# Patient Record
Sex: FEMALE | Race: BLACK OR AFRICAN AMERICAN | NOT HISPANIC OR LATINO | Employment: OTHER | URBAN - METROPOLITAN AREA
[De-identification: names, ages, dates, MRNs, and addresses within clinical notes are randomized per-mention and may not be internally consistent; named-entity substitution may affect disease eponyms.]

---

## 2017-02-15 ENCOUNTER — HISTORICAL (OUTPATIENT)
Dept: BARIATRICS | Facility: HOSPITAL | Age: 52
End: 2017-02-15

## 2017-04-21 ENCOUNTER — HISTORICAL (OUTPATIENT)
Dept: BARIATRICS | Facility: HOSPITAL | Age: 52
End: 2017-04-21

## 2017-06-21 ENCOUNTER — HISTORICAL (OUTPATIENT)
Dept: BARIATRICS | Facility: HOSPITAL | Age: 52
End: 2017-06-21

## 2017-10-06 ENCOUNTER — HISTORICAL (OUTPATIENT)
Dept: BARIATRICS | Facility: HOSPITAL | Age: 52
End: 2017-10-06

## 2017-12-13 ENCOUNTER — HISTORICAL (OUTPATIENT)
Dept: BARIATRICS | Facility: HOSPITAL | Age: 52
End: 2017-12-13

## 2018-07-09 ENCOUNTER — HISTORICAL (OUTPATIENT)
Dept: BARIATRICS | Facility: HOSPITAL | Age: 53
End: 2018-07-09

## 2019-06-26 PROBLEM — E78.2 MIXED HYPERLIPIDEMIA: Status: ACTIVE | Noted: 2018-10-22

## 2019-06-26 PROBLEM — M12.9 ARTHROPATHY: Status: ACTIVE | Noted: 2018-10-22

## 2020-01-21 PROBLEM — F32.A DEPRESSION: Status: ACTIVE | Noted: 2020-01-21

## 2020-01-21 PROBLEM — F41.9 ANXIETY: Status: ACTIVE | Noted: 2020-01-21

## 2020-11-06 PROBLEM — M19.90 OSTEOARTHRITIS: Status: ACTIVE | Noted: 2020-11-06

## 2022-02-28 ENCOUNTER — HISTORICAL (OUTPATIENT)
Dept: ADMINISTRATIVE | Facility: HOSPITAL | Age: 57
End: 2022-02-28

## 2022-06-14 PROBLEM — H91.93 BILATERAL HEARING LOSS: Status: ACTIVE | Noted: 2022-06-14

## 2022-06-17 ENCOUNTER — HISTORICAL (OUTPATIENT)
Dept: ADMINISTRATIVE | Facility: HOSPITAL | Age: 57
End: 2022-06-17

## 2022-09-30 PROBLEM — F41.1 GAD (GENERALIZED ANXIETY DISORDER): Status: ACTIVE | Noted: 2022-09-30

## 2023-03-07 ENCOUNTER — LAB VISIT (OUTPATIENT)
Dept: LAB | Facility: HOSPITAL | Age: 58
End: 2023-03-07
Attending: OTOLARYNGOLOGY
Payer: OTHER GOVERNMENT

## 2023-03-07 DIAGNOSIS — Z79.01 LONG TERM (CURRENT) USE OF ANTICOAGULANTS: Primary | ICD-10-CM

## 2023-03-07 DIAGNOSIS — Z79.01 LONG TERM (CURRENT) USE OF ANTICOAGULANTS: ICD-10-CM

## 2023-03-07 DIAGNOSIS — Z01.818 OTHER SPECIFIED PRE-OPERATIVE EXAMINATION: ICD-10-CM

## 2023-03-07 LAB
ALBUMIN SERPL-MCNC: 3.8 G/DL (ref 3.5–5)
ALBUMIN/GLOB SERPL: 1.1 RATIO (ref 1.1–2)
ALP SERPL-CCNC: 66 UNIT/L (ref 40–150)
ALT SERPL-CCNC: 20 UNIT/L (ref 0–55)
APTT PPP: 27.8 SECONDS (ref 23.2–33.7)
AST SERPL-CCNC: 22 UNIT/L (ref 5–34)
BASOPHILS # BLD AUTO: 0.02 X10(3)/MCL (ref 0–0.2)
BASOPHILS NFR BLD AUTO: 0.5 %
BILIRUBIN DIRECT+TOT PNL SERPL-MCNC: 0.4 MG/DL
BUN SERPL-MCNC: 14.2 MG/DL (ref 9.8–20.1)
CALCIUM SERPL-MCNC: 9.5 MG/DL (ref 8.4–10.2)
CHLORIDE SERPL-SCNC: 107 MMOL/L (ref 98–107)
CO2 SERPL-SCNC: 28 MMOL/L (ref 22–29)
CREAT SERPL-MCNC: 0.78 MG/DL (ref 0.55–1.02)
EOSINOPHIL # BLD AUTO: 0.05 X10(3)/MCL (ref 0–0.9)
EOSINOPHIL NFR BLD AUTO: 1.3 %
ERYTHROCYTE [DISTWIDTH] IN BLOOD BY AUTOMATED COUNT: 13.1 % (ref 11.5–17)
GFR SERPLBLD CREATININE-BSD FMLA CKD-EPI: >60 MLS/MIN/1.73/M2
GLOBULIN SER-MCNC: 3.6 GM/DL (ref 2.4–3.5)
GLUCOSE SERPL-MCNC: 81 MG/DL (ref 74–100)
HCT VFR BLD AUTO: 41.8 % (ref 37–47)
HGB BLD-MCNC: 13 G/DL (ref 12–16)
IMM GRANULOCYTES # BLD AUTO: 0.01 X10(3)/MCL (ref 0–0.04)
IMM GRANULOCYTES NFR BLD AUTO: 0.3 %
INR BLD: 0.99 (ref 0–1.3)
LYMPHOCYTES # BLD AUTO: 1.45 X10(3)/MCL (ref 0.6–4.6)
LYMPHOCYTES NFR BLD AUTO: 37.9 %
MCH RBC QN AUTO: 27.8 PG
MCHC RBC AUTO-ENTMCNC: 31.1 G/DL (ref 33–36)
MCV RBC AUTO: 89.3 FL (ref 80–94)
MONOCYTES # BLD AUTO: 0.26 X10(3)/MCL (ref 0.1–1.3)
MONOCYTES NFR BLD AUTO: 6.8 %
NEUTROPHILS # BLD AUTO: 2.04 X10(3)/MCL (ref 2.1–9.2)
NEUTROPHILS NFR BLD AUTO: 53.2 %
NRBC BLD AUTO-RTO: 0 %
PLATELET # BLD AUTO: 263 X10(3)/MCL (ref 130–400)
PMV BLD AUTO: 12.1 FL (ref 7.4–10.4)
POTASSIUM SERPL-SCNC: 4.1 MMOL/L (ref 3.5–5.1)
PROT SERPL-MCNC: 7.4 GM/DL (ref 6.4–8.3)
PROTHROMBIN TIME: 13 SECONDS (ref 12.5–14.5)
RBC # BLD AUTO: 4.68 X10(6)/MCL (ref 4.2–5.4)
SODIUM SERPL-SCNC: 142 MMOL/L (ref 136–145)
WBC # SPEC AUTO: 3.8 X10(3)/MCL (ref 4.5–11.5)

## 2023-03-07 PROCEDURE — 36415 COLL VENOUS BLD VENIPUNCTURE: CPT

## 2023-03-07 PROCEDURE — 85730 THROMBOPLASTIN TIME PARTIAL: CPT

## 2023-03-07 PROCEDURE — 85610 PROTHROMBIN TIME: CPT

## 2023-03-07 PROCEDURE — 85025 COMPLETE CBC W/AUTO DIFF WBC: CPT

## 2023-03-07 PROCEDURE — 80053 COMPREHEN METABOLIC PANEL: CPT

## 2023-06-28 ENCOUNTER — HOSPITAL ENCOUNTER (OUTPATIENT)
Dept: RADIOLOGY | Facility: HOSPITAL | Age: 58
Discharge: HOME | End: 2023-06-28
Attending: NURSE PRACTITIONER
Payer: OTHER GOVERNMENT

## 2023-06-28 VITALS — WEIGHT: 293 LBS | BODY MASS INDEX: 53.92 KG/M2 | HEIGHT: 62 IN

## 2023-06-28 DIAGNOSIS — Z12.31 BREAST CANCER SCREENING BY MAMMOGRAM: ICD-10-CM

## 2023-06-28 PROCEDURE — 77067 SCR MAMMO BI INCL CAD: CPT | Mod: TC

## 2023-06-28 NOTE — PROGRESS NOTES
Notified patient of normal mammogram findings and to repeat in one year , recommended Monthly Self breast exams

## 2023-10-31 ENCOUNTER — OUTSIDE PLACE OF SERVICE (OUTPATIENT)
Dept: PULMONOLOGY | Facility: CLINIC | Age: 58
End: 2023-10-31
Payer: OTHER GOVERNMENT

## 2023-11-02 PROCEDURE — 95810 POLYSOM 6/> YRS 4/> PARAM: CPT | Mod: 26,,, | Performed by: INTERNAL MEDICINE

## 2023-11-02 PROCEDURE — 95810 PR POLYSOMNOGRAPHY, 4 OR MORE: ICD-10-PCS | Mod: 26,,, | Performed by: INTERNAL MEDICINE

## 2023-11-22 ENCOUNTER — TELEPHONE (OUTPATIENT)
Dept: UROLOGY | Facility: CLINIC | Age: 58
End: 2023-11-22
Payer: OTHER GOVERNMENT

## 2023-12-06 ENCOUNTER — OFFICE VISIT (OUTPATIENT)
Dept: UROLOGY | Facility: CLINIC | Age: 58
End: 2023-12-06
Payer: OTHER GOVERNMENT

## 2023-12-06 VITALS — WEIGHT: 290 LBS | BODY MASS INDEX: 53.37 KG/M2 | HEIGHT: 62 IN

## 2023-12-06 DIAGNOSIS — N39.41 URGE INCONTINENCE: ICD-10-CM

## 2023-12-06 DIAGNOSIS — R39.15 URINARY URGENCY: ICD-10-CM

## 2023-12-06 DIAGNOSIS — R35.0 URINARY FREQUENCY: Primary | ICD-10-CM

## 2023-12-06 LAB
BILIRUBIN, UA POC OHS: NEGATIVE
BLOOD, UA POC OHS: NEGATIVE
CLARITY, UA POC OHS: CLEAR
COLOR, UA POC OHS: YELLOW
GLUCOSE, UA POC OHS: NEGATIVE
KETONES, UA POC OHS: NEGATIVE
LEUKOCYTES, UA POC OHS: NEGATIVE
NITRITE, UA POC OHS: NEGATIVE
PH, UA POC OHS: 7
PROTEIN, UA POC OHS: NEGATIVE
SPECIFIC GRAVITY, UA POC OHS: 1.02
UROBILINOGEN, UA POC OHS: 0.2

## 2023-12-06 PROCEDURE — 99204 PR OFFICE/OUTPT VISIT, NEW, LEVL IV, 45-59 MIN: ICD-10-PCS | Mod: S$GLB,,, | Performed by: NURSE PRACTITIONER

## 2023-12-06 PROCEDURE — 81003 URINALYSIS AUTO W/O SCOPE: CPT | Mod: QW,S$GLB,, | Performed by: NURSE PRACTITIONER

## 2023-12-06 PROCEDURE — 81003 POCT URINALYSIS(INSTRUMENT): ICD-10-PCS | Mod: QW,S$GLB,, | Performed by: NURSE PRACTITIONER

## 2023-12-06 PROCEDURE — 99204 OFFICE O/P NEW MOD 45 MIN: CPT | Mod: S$GLB,,, | Performed by: NURSE PRACTITIONER

## 2023-12-06 RX ORDER — OXYBUTYNIN CHLORIDE 15 MG/1
15 TABLET, EXTENDED RELEASE ORAL DAILY
Qty: 90 TABLET | Refills: 3 | Status: SHIPPED | OUTPATIENT
Start: 2023-12-06 | End: 2024-01-31

## 2023-12-06 NOTE — PROGRESS NOTES
Subjective:       Patient ID: Xena Holliday is a 58 y.o. female.    Chief Complaint: No chief complaint on file.      HPI: 58-year-old female, new to [facility], [facility]    Patient has history of urinary frequency, urinary urgency, and incontinence.    Patient was on Myrbetriq with Dr. Childs.  [facility] states that the [facility] discontinued [facility].  Patient states that she has to wear pads.  Goes through multiple per day.  She denies any pain or burning urination.  Denies any odor urine.  Denies any fever or body aches.  Denies any unexpected weight loss.  Denies any blood in urine.    No other urinary complaints at this time.         Past Medical History:   Past Medical History:   Diagnosis Date    Anxiety     Arthritis     Bilateral hearing loss 06/14/2022    Wears hearing aids    Depression     Diverticulitis     Essential hypertension 08/04/2014    JOHN (generalized anxiety disorder) 09/30/2022    GERD (gastroesophageal reflux disease)     Mixed hyperlipidemia 10/22/2018    Used to take a statin but stopped after gastric sleeve PCP was Dr. Slaughter but now seeing us and we will refill meds in the future (10/2018) Colonoscopy 2018 fine MMG 2018 normal.  Total Hyst so no PAPs anymore. Hx Gastric Sleeve 2016 (lost 120lb) had Pneumonia vaccine in the past 5 years.  not due again until age 67    PTSD (post-traumatic stress disorder)     Sinusitis     Sleep apnea     Tinnitus     Vertigo        Past Surgical Historical:        Medications:   Medication List with Changes/Refills   New Medications    OXYBUTYNIN (DITROPAN XL) 15 MG TR24    Take 1 tablet (15 mg total) by mouth once daily.   Current Medications    ALPRAZOLAM (XANAX) 0.25 MG TABLET    TAKE ONE TABLET BY MOUTH EVERY DAY AS NEEDED anxiety    ARIPIPRAZOLE (ABILIFY) 2 MG TAB    TAKE ONE TABLET BY MOUTH EVERY MORNING    CYANOCOBALAMIN (VITAMIN B-12) 1000 MCG TABLET    Take 1 tablet by mouth once daily.    DESONIDE (DESOWEN) 0.05 % CREAM     APPLY TO THE AFFECTED AREA(S) TWICE DAILY FOR SEVEN DAYS    DEXLANSOPRAZOLE (DEXILANT) 60 MG CAPSULE    Take 60 mg by mouth Daily.    DICLOFENAC SODIUM (VOLTAREN) 1 % GEL    Apply 2 g topically 4 (four) times daily. To affected area of ankle and elbow    ELETRIPTAN (RELPAX) 40 MG TABLET    Take 1 tablet by mouth once daily.    EMGALITY  MG/ML PNIJ    inject 120mg into THE SKIN EVERY 28 DAYS    FAMOTIDINE (PEPCID) 40 MG TABLET    take 1 tablet by mouth every evening as needed for breakthrough heartburn and acid relfux    FOLIC ACID (FOLVITE) 1 MG TABLET    Take 1 tablet by mouth once daily.    HYDRALAZINE (APRESOLINE) 10 MG TABLET    Take 10 mg by mouth 3 (three) times daily.    MAGNESIUM OXIDE (MAG-OX) 400 MG (241.3 MG MAGNESIUM) TABLET    Take 1 tablet by mouth nightly.    METHYL SALICYLATE-MENTHOL 15-10% 15-10 % CREA    APPLY MODERATE AMOUNT TO THE SKIN THREE TIMES DAILY AS NEEDED FOR PAIN    METOCLOPRAMIDE HCL (REGLAN) 10 MG TABLET    5 mg.    MOUNJARO 2.5 MG/0.5 ML PNIJ    INJECT 2.5ml subcutaneously every week FOR FOUR WEEKS THEN increase    MULTIVITAMIN (THERAGRAN) PER TABLET    Take 1 tablet by mouth once daily.    MYRBETRIQ 50 MG TB24    take 1 tablet by mouth once daily    NAPROXEN (NAPROSYN) 500 MG TABLET    Take 1 tablet (500 mg total) by mouth 2 (two) times daily as needed (pain). Take with food    NEXLIZET 180-10 MG TAB    TAKE ONE TABLET BY MOUTH EVERY DAY    NYSTATIN (MYCOSTATIN) POWDER    Apply topically 2 (two) times daily. To affected area of skin    OLMESARTAN-HYDROCHLOROTHIAZIDE (BENICAR HCT) 40-12.5 MG TAB    Take 1 tablet by mouth once daily.    OMEPRAZOLE (PRILOSEC) 40 MG CAPSULE    Take 1 capsule (40 mg total) by mouth every morning.    OZEMPIC 0.25 MG OR 0.5 MG(2 MG/1.5 ML) PEN INJECTOR    inject 0.5mg subcutaneously EVERY WEEK    OZEMPIC 1 MG/DOSE (4 MG/3 ML)    inject 1mg ONCE WEEKLY ON THE same DAY of each WEEK    PRAZOSIN (MINIPRESS) 5 MG CAPSULE    Take 1 capsule (5 mg total) by  mouth every evening.    PREDNISONE (DELTASONE) 20 MG TABLET    Take 20mg by mouth twice daily for 2 days then 20mg by mouth once daily for 3 days.    PROPRANOLOL (INDERAL LA) 60 MG 24 HR CAPSULE    Take 1 capsule (60 mg total) by mouth once daily.    ROSUVASTATIN (CRESTOR) 40 MG TAB    TAKE ONE TABLET BY MOUTH EVERY DAY FOR CHOLESTEROL /LDL LOWERING    SERTRALINE (ZOLOFT) 100 MG TABLET    take 2 tablets by mouth every evening    TRAZODONE (DESYREL) 150 MG TABLET    Take 1 tablet (150 mg total) by mouth every evening.    VITAMIN D (VITAMIN D3) 1000 UNITS TAB    Take 2,000 Units by mouth 2 (two) times a day.        Past Social History:   Social History     Socioeconomic History    Marital status:    Occupational History    Occupation: [employer]     Employer: [email or web address]   Tobacco Use    Smoking status: Never    Smokeless tobacco: Never   Substance and Sexual Activity    Alcohol use: Not Currently    Drug use: Never       Allergies:   Review of patient's allergies indicates:   Allergen Reactions    Tramadol Anaphylaxis    Acetaminophen-codeine     Hydrocod-cpm-pe-acetaminophen      Suicidal ideations    Lisinopril Swelling    Nsaids (non-steroidal anti-inflammatory drug)      Patient stated she was instructed to refrain from NSAID's after weight loss surgery in 2016    Toradol [ketorolac] Itching    Adhesive Blisters        Family History:   Family History   Problem Relation Age of Onset    Hypertension Mother     Diabetes Mother     Hyperlipidemia Mother     COPD Mother     Heart disease Father     Heart disease Brother     Diabetes Maternal Grandmother     Lupus Paternal Uncle     Lupus Paternal Aunt     Breast cancer Neg Hx     Colon cancer Neg Hx         Review of Systems:  Review of Systems   Constitutional:  Negative for activity change and appetite change.   HENT:  Negative for congestion and dental problem.    Respiratory:  Negative for chest tightness and shortness of breath.     Cardiovascular:  Negative for chest pain.   Gastrointestinal:  Negative for abdominal distention and abdominal pain.   Genitourinary:  Positive for frequency and urgency. Negative for decreased urine volume, difficulty urinating, dyspareunia, dysuria, enuresis, flank pain, genital sores, hematuria and pelvic pain.   Musculoskeletal:  Negative for back pain and neck pain.   Allergic/Immunologic: Negative for immunocompromised state.   Neurological:  Negative for dizziness.   Hematological:  Negative for adenopathy.   Psychiatric/Behavioral:  Negative for agitation, behavioral problems and confusion.        Physical Exam:  Physical Exam  Vitals and nursing note reviewed.   Constitutional:       Appearance: She is well-developed.   HENT:      Head: Normocephalic.   Eyes:      Pupils: Pupils are equal, round, and reactive to light.   Cardiovascular:      Rate and Rhythm: Normal rate and regular rhythm.      Heart sounds: Normal heart sounds.   Pulmonary:      Effort: Pulmonary effort is normal.      Breath sounds: Normal breath sounds.   Abdominal:      General: Bowel sounds are normal.      Palpations: Abdomen is soft.   Musculoskeletal:         General: Normal range of motion.      Cervical back: Normal range of motion and neck supple.   Skin:     General: Skin is warm and dry.   Neurological:      Mental Status: She is alert and oriented to person, place, and time.   Psychiatric:         Mood and Affect: Mood normal.         Behavior: Behavior normal.       Urinalysis:  Normal   Bladder scan:  0 cc    Assessment/Plan:   Frequency/urgency/incontinence:  Patient has been on Myrbetriq in the past with improvement.    However, [facility] not [facility]    Will start patient on oxybutynin XL 15 mg daily.    Will plan follow-up in 6-8 weeks for re-evaluation, sooner if needed.  Problem List Items Addressed This Visit    None  Visit Diagnoses       Urinary frequency    -  Primary    Relevant Medications    oxybutynin  (DITROPAN XL) 15 MG TR24    Other Relevant Orders    POCT Urinalysis(Instrument)    POCT Bladder Scan    Urinary urgency        Relevant Medications    oxybutynin (DITROPAN XL) 15 MG TR24    Other Relevant Orders    POCT Urinalysis(Instrument)    POCT Bladder Scan    Urge incontinence        Relevant Medications    oxybutynin (DITROPAN XL) 15 MG TR24    Other Relevant Orders    POCT Urinalysis(Instrument)    POCT Bladder Scan

## 2024-01-31 ENCOUNTER — OFFICE VISIT (OUTPATIENT)
Dept: UROLOGY | Facility: CLINIC | Age: 59
End: 2024-01-31
Payer: OTHER GOVERNMENT

## 2024-01-31 VITALS — HEIGHT: 62 IN | WEIGHT: 290 LBS | BODY MASS INDEX: 53.37 KG/M2

## 2024-01-31 DIAGNOSIS — R35.0 URINARY FREQUENCY: Primary | ICD-10-CM

## 2024-01-31 DIAGNOSIS — R39.15 URINARY URGENCY: ICD-10-CM

## 2024-01-31 PROCEDURE — 99213 OFFICE O/P EST LOW 20 MIN: CPT | Mod: S$GLB,,, | Performed by: NURSE PRACTITIONER

## 2024-01-31 RX ORDER — AMLODIPINE BESYLATE 5 MG/1
1 TABLET ORAL DAILY
COMMUNITY
Start: 2023-11-21

## 2024-01-31 RX ORDER — LOSARTAN POTASSIUM AND HYDROCHLOROTHIAZIDE 12.5; 5 MG/1; MG/1
1 TABLET ORAL DAILY PRN
COMMUNITY
Start: 2023-10-26

## 2024-01-31 RX ORDER — SOLIFENACIN SUCCINATE 10 MG/1
10 TABLET, FILM COATED ORAL DAILY
Qty: 90 TABLET | Refills: 3 | Status: SHIPPED | OUTPATIENT
Start: 2024-01-31 | End: 2024-04-10

## 2024-01-31 NOTE — PROGRESS NOTES
Subjective:       Patient ID: Xena Holliday is a 58 y.o. female.    Chief Complaint: No chief complaint on file.      HPI: 58-year-old female, established patient, presents for 6 week follow-up.    Patient has history of urinary frequency and urgency.    She was on Myrbetriq.  However, [facility].  Pain for the medication.  This was working well for with no complaints.    Will put the patient on oxybutynin XL 15 mg daily.  She states she felt like she was not emptying her bladder all the way.  Medication was not as effective as Myrbetriq.    Denies any pain or burning urination.  Denies any odor to the urine.  Denies any fever or body aches.    No other urinary complaints at this time.         Past Medical History:   Past Medical History:   Diagnosis Date    Anxiety     Arthritis     Bilateral hearing loss 06/14/2022    Wears hearing aids    Depression     Diverticulitis     Essential hypertension 08/04/2014    JOHN (generalized anxiety disorder) 09/30/2022    GERD (gastroesophageal reflux disease)     Mixed hyperlipidemia 10/22/2018    Used to take a statin but stopped after gastric sleeve PCP was Dr. Slaughter but now seeing us and we will refill meds in the future (10/2018) Colonoscopy 2018 fine MMG 2018 normal.  Total Hyst so no PAPs anymore. Hx Gastric Sleeve 2016 (lost 120lb) had Pneumonia vaccine in the past 5 years.  not due again until age 67    PTSD (post-traumatic stress disorder)     Sinusitis     Sleep apnea     Tinnitus     Vertigo        Past Surgical Historical:        Medications:   Medication List with Changes/Refills   New Medications    SOLIFENACIN (VESICARE) 10 MG TABLET    Take 1 tablet (10 mg total) by mouth once daily.   Current Medications    ALPRAZOLAM (XANAX) 0.25 MG TABLET    TAKE ONE TABLET BY MOUTH EVERY DAY AS NEEDED anxiety    AMLODIPINE (NORVASC) 5 MG TABLET    Take 1 tablet by mouth once daily.    ARIPIPRAZOLE (ABILIFY) 2 MG TAB    TAKE ONE TABLET BY MOUTH EVERY MORNING     CYANOCOBALAMIN (VITAMIN B-12) 1000 MCG TABLET    Take 1 tablet by mouth once daily.    DESONIDE (DESOWEN) 0.05 % CREAM    APPLY TO THE AFFECTED AREA(S) TWICE DAILY FOR SEVEN DAYS    DEXLANSOPRAZOLE (DEXILANT) 60 MG CAPSULE    Take 60 mg by mouth Daily.    DICLOFENAC SODIUM (VOLTAREN) 1 % GEL    Apply 2 g topically 4 (four) times daily. To affected area of ankle and elbow    ELETRIPTAN (RELPAX) 40 MG TABLET    Take 1 tablet by mouth once daily.    EMGALITY  MG/ML PNIJ    inject 120mg into THE SKIN EVERY 28 DAYS    FAMOTIDINE (PEPCID) 40 MG TABLET    take 1 tablet by mouth every evening as needed for breakthrough heartburn and acid relfux    FOLIC ACID (FOLVITE) 1 MG TABLET    Take 1 tablet by mouth once daily.    HYDRALAZINE (APRESOLINE) 10 MG TABLET    Take 10 mg by mouth 3 (three) times daily.    LOSARTAN-HYDROCHLOROTHIAZIDE 50-12.5 MG (HYZAAR) 50-12.5 MG PER TABLET    Take 1 tablet by mouth daily as needed.    MAGNESIUM OXIDE (MAG-OX) 400 MG (241.3 MG MAGNESIUM) TABLET    Take 1 tablet by mouth nightly.    METHYL SALICYLATE-MENTHOL 15-10% 15-10 % CREA    APPLY MODERATE AMOUNT TO THE SKIN THREE TIMES DAILY AS NEEDED FOR PAIN    METOCLOPRAMIDE HCL (REGLAN) 10 MG TABLET    5 mg.    MOUNJARO 2.5 MG/0.5 ML PNIJ    INJECT 2.5ml subcutaneously every week FOR FOUR WEEKS THEN increase    MULTIVITAMIN (THERAGRAN) PER TABLET    Take 1 tablet by mouth once daily.    NAPROXEN (NAPROSYN) 500 MG TABLET    Take 1 tablet (500 mg total) by mouth 2 (two) times daily as needed (pain). Take with food    NEXLIZET 180-10 MG TAB    TAKE ONE TABLET BY MOUTH EVERY DAY    NYSTATIN (MYCOSTATIN) POWDER    Apply topically 2 (two) times daily. To affected area of skin    OLMESARTAN-HYDROCHLOROTHIAZIDE (BENICAR HCT) 40-12.5 MG TAB    Take 1 tablet by mouth once daily.    OMEPRAZOLE (PRILOSEC) 40 MG CAPSULE    Take 1 capsule (40 mg total) by mouth every morning.    OZEMPIC 0.25 MG OR 0.5 MG(2 MG/1.5 ML) PEN INJECTOR    inject 0.5mg  subcutaneously EVERY WEEK    OZEMPIC 1 MG/DOSE (4 MG/3 ML)    inject 1mg ONCE WEEKLY ON THE same DAY of each WEEK    PRAZOSIN (MINIPRESS) 5 MG CAPSULE    Take 1 capsule (5 mg total) by mouth every evening.    PREDNISONE (DELTASONE) 20 MG TABLET    Take 20mg by mouth twice daily for 2 days then 20mg by mouth once daily for 3 days.    PROPRANOLOL (INDERAL LA) 60 MG 24 HR CAPSULE    Take 1 capsule (60 mg total) by mouth once daily.    ROSUVASTATIN (CRESTOR) 40 MG TAB    TAKE ONE TABLET BY MOUTH EVERY DAY FOR CHOLESTEROL /LDL LOWERING    SERTRALINE (ZOLOFT) 100 MG TABLET    take 2 tablets by mouth every evening    TRAZODONE (DESYREL) 150 MG TABLET    Take 1 tablet (150 mg total) by mouth every evening.    VITAMIN D (VITAMIN D3) 1000 UNITS TAB    Take 2,000 Units by mouth 2 (two) times a day.   Discontinued Medications    MYRBETRIQ 50 MG TB24    take 1 tablet by mouth once daily    OXYBUTYNIN (DITROPAN XL) 15 MG TR24    Take 1 tablet (15 mg total) by mouth once daily.        Past Social History:   Social History     Socioeconomic History    Marital status:    Occupational History    Occupation: [employer]     Employer: [employer]   Tobacco Use    Smoking status: Never    Smokeless tobacco: Never   Substance and Sexual Activity    Alcohol use: Not Currently    Drug use: Never       Allergies:   Review of patient's allergies indicates:   Allergen Reactions    Tramadol Anaphylaxis    Acetaminophen-codeine     Hydrocod-cpm-pe-acetaminophen      Suicidal ideations    Lisinopril Swelling    Nsaids (non-steroidal anti-inflammatory drug)      Patient stated she was instructed to refrain from NSAID's after weight loss surgery in 2016    Toradol [ketorolac] Itching    Adhesive Blisters        Family History:   Family History   Problem Relation Age of Onset    Hypertension Mother     Diabetes Mother     Hyperlipidemia Mother     COPD Mother     Heart disease Father     Heart disease Brother     Diabetes  Maternal Grandmother     Lupus Paternal Uncle     Lupus Paternal Aunt     Breast cancer Neg Hx     Colon cancer Neg Hx         Review of Systems:  Review of Systems   Constitutional:  Negative for activity change and appetite change.   HENT:  Negative for congestion and dental problem.    Respiratory:  Negative for chest tightness and shortness of breath.    Cardiovascular:  Negative for chest pain.   Gastrointestinal:  Negative for abdominal distention.   Genitourinary:  Positive for frequency and urgency. Negative for decreased urine volume, difficulty urinating, dyspareunia, dysuria, enuresis, flank pain, genital sores, hematuria and pelvic pain.   Musculoskeletal:  Negative for back pain and neck pain.   Allergic/Immunologic: Negative for immunocompromised state.   Neurological:  Negative for dizziness.   Hematological:  Negative for adenopathy.   Psychiatric/Behavioral:  Negative for agitation, behavioral problems and confusion.        Physical Exam:  Physical Exam  Vitals and nursing note reviewed.   Constitutional:       Appearance: She is well-developed.   HENT:      Head: Normocephalic.   Cardiovascular:      Rate and Rhythm: Normal rate and regular rhythm.      Heart sounds: Normal heart sounds.   Pulmonary:      Effort: Pulmonary effort is normal.      Breath sounds: Normal breath sounds.   Abdominal:      General: Bowel sounds are normal.      Palpations: Abdomen is soft.   Skin:     General: Skin is warm and dry.   Neurological:      Mental Status: She is alert and oriented to person, place, and time.       Bladder scan 0 cc    Assessment/Plan:   Frequency/urgency:  Oxybutynin not effective.  Patient has been results with Myrbetriq.  According to the patient when he established failure with other products.    Will try VESIcare 10 mg daily.    Will follow-up in 3 months for re-evaluation, sooner if needed.  Problem List Items Addressed This Visit    None  Visit Diagnoses       Urinary frequency    -   Primary    Relevant Medications    solifenacin (VESICARE) 10 MG tablet    Other Relevant Orders    POCT Bladder Scan    Urinary urgency        Relevant Medications    solifenacin (VESICARE) 10 MG tablet    Other Relevant Orders    POCT Bladder Scan

## 2024-04-10 ENCOUNTER — OFFICE VISIT (OUTPATIENT)
Dept: UROLOGY | Facility: CLINIC | Age: 59
End: 2024-04-10
Payer: OTHER GOVERNMENT

## 2024-04-10 VITALS — HEIGHT: 62 IN | WEIGHT: 290 LBS | BODY MASS INDEX: 53.37 KG/M2

## 2024-04-10 DIAGNOSIS — R39.9 LOWER URINARY TRACT SYMPTOMS (LUTS): ICD-10-CM

## 2024-04-10 DIAGNOSIS — N39.41 URGE INCONTINENCE: Primary | ICD-10-CM

## 2024-04-10 LAB
BILIRUBIN, UA POC OHS: NEGATIVE
BLOOD, UA POC OHS: NEGATIVE
CLARITY, UA POC OHS: CLEAR
COLOR, UA POC OHS: YELLOW
GLUCOSE, UA POC OHS: NEGATIVE
KETONES, UA POC OHS: NEGATIVE
LEUKOCYTES, UA POC OHS: NEGATIVE
NITRITE, UA POC OHS: NEGATIVE
PH, UA POC OHS: 6
PROTEIN, UA POC OHS: NEGATIVE
SPECIFIC GRAVITY, UA POC OHS: 1.02
UROBILINOGEN, UA POC OHS: 0.2

## 2024-04-10 PROCEDURE — 99214 OFFICE O/P EST MOD 30 MIN: CPT | Mod: S$GLB,,,

## 2024-04-10 PROCEDURE — 81003 URINALYSIS AUTO W/O SCOPE: CPT | Mod: QW,S$GLB,,

## 2024-04-10 RX ORDER — TROSPIUM CHLORIDE 20 MG/1
20 TABLET, FILM COATED ORAL 2 TIMES DAILY
Qty: 60 TABLET | Refills: 0 | Status: SHIPPED | OUTPATIENT
Start: 2024-04-10 | End: 2024-05-10

## 2024-04-10 NOTE — PROGRESS NOTES
Subjective:       Patient ID: Xena Holliday is a 58 y.o. female.    Chief Complaint: Follow-up Clinical Reassessment      HPI: 58-year-old female established patient here for 3 month follow-up of medication for urge incontinence. At one time patient was on Myrbetric prescribed from Dr. Ring that the patient said worked well for her, however [facility] stopped [facility] that medication. Patient was then started on oxybutynin extended release that was ineffective and at last visit was started on VESIcare. Patient reports today that VESIcare is also ineffective. Patient continues to have urinary urgency and bladder pressure at the end of voiding with dribbling. Patient reports she is currently wearing panty liners and the rate of changing varies from day today. Typically she changes her panty liner 1-2 times per day however she stays close to the bathroom to avoid full episodes of incontinence. Patient denies dysuria, odor, fever chills, urinary frequency, or other symptoms of infection. Denies symptoms of stress incontinence.         Past Medical History:   Past Medical History:   Diagnosis Date    Anxiety     Arthritis     Bilateral hearing loss 06/14/2022    Wears hearing aids    Depression     Diverticulitis     Essential hypertension 08/04/2014    JOHN (generalized anxiety disorder) 09/30/2022    GERD (gastroesophageal reflux disease)     Mixed hyperlipidemia 10/22/2018    Used to take a statin but stopped after gastric sleeve PCP was Dr. Slaughter but now seeing us and we will refill meds in the future (10/2018) Colonoscopy 2018 fine [facility] 2018 normal.  Total Hyst so no PAPs anymore. Hx Gastric Sleeve 2016 (lost 120lb) had Pneumonia vaccine in the past 5 years.  not due again until age 67    PTSD (post-traumatic stress disorder)     Sinusitis     Sleep apnea     Tinnitus     Vertigo        Past Surgical Historical:   Past Surgical History:   Procedure Laterality Date    carpel tunnel Left 04/2022    carpel  tunnel sx right  Right     7/23/21    CHOLECYSTECTOMY      COLONOSCOPY N/A     Gastric sleeve  12/2016    HYSTERECTOMY  2007    Total    IMPLANTATION OF COCHLEAR PROSTHESIS Left 3/9/2023    Procedure: LEFT COCHLEAR IMPLANT *** FACICAL NERVE MONITORIN WILL BE REQUIRED BY NDM***;  Surgeon: Jordan Colmenares Jr., MD;  Location: [facility];  Service: ENT;  Laterality: Left;    KNEE ARTHROSCOPY      TUBAL LIGATION          Medications:   Medication List with Changes/Refills   New Medications    TROSPIUM (SANCTURA) 20 MG TAB TABLET    Take 1 tablet (20 mg total) by mouth 2 (two) times daily. for 60 doses   Current Medications    ALPRAZOLAM (XANAX) 0.25 MG TABLET    TAKE ONE TABLET BY MOUTH EVERY DAY AS NEEDED anxiety    AMLODIPINE (NORVASC) 5 MG TABLET    Take 1 tablet by mouth once daily.    ARIPIPRAZOLE (ABILIFY) 2 MG TAB    TAKE ONE TABLET BY MOUTH EVERY MORNING    CYANOCOBALAMIN (VITAMIN B-12) 1000 MCG TABLET    Take 1 tablet by mouth once daily.    DESONIDE (DESOWEN) 0.05 % CREAM    APPLY TO THE AFFECTED AREA(S) TWICE DAILY FOR SEVEN DAYS    DICLOFENAC SODIUM (VOLTAREN) 1 % GEL    Apply 2 g topically 4 (four) times daily. To affected area of ankle and elbow    ELETRIPTAN (RELPAX) 40 MG TABLET    Take 1 tablet by mouth once daily.    EMGALITY  MG/ML PNIJ    inject 120mg into THE SKIN EVERY 28 DAYS    FAMOTIDINE (PEPCID) 40 MG TABLET    take 1 tablet by mouth every evening as needed for breakthrough heartburn and acid relfux    FOLIC ACID (FOLVITE) 1 MG TABLET    Take 1 tablet by mouth once daily.    HYDRALAZINE (APRESOLINE) 10 MG TABLET    Take 10 mg by mouth 3 (three) times daily.    LOSARTAN-HYDROCHLOROTHIAZIDE 50-12.5 MG (HYZAAR) 50-12.5 MG PER TABLET    Take 1 tablet by mouth daily as needed.    MAGNESIUM OXIDE (MAG-OX) 400 MG (241.3 MG MAGNESIUM) TABLET    Take 1 tablet by mouth nightly.    METHYL SALICYLATE-MENTHOL 15-10% 15-10 % CREA    APPLY MODERATE AMOUNT TO THE SKIN THREE TIMES DAILY AS NEEDED FOR PAIN     METOCLOPRAMIDE HCL (REGLAN) 10 MG TABLET    5 mg.    MULTIVITAMIN (THERAGRAN) PER TABLET    Take 1 tablet by mouth once daily.    NAPROXEN (NAPROSYN) 500 MG TABLET    Take 1 tablet (500 mg total) by mouth 2 (two) times daily as needed (pain). Take with food    NYSTATIN (MYCOSTATIN) POWDER    Apply topically 2 (two) times daily. To affected area of skin    OLMESARTAN-HYDROCHLOROTHIAZIDE (BENICAR HCT) 40-12.5 MG TAB    Take 1 tablet by mouth once daily.    OMEPRAZOLE (PRILOSEC) 40 MG CAPSULE    Take 1 capsule (40 mg total) by mouth every morning.    OZEMPIC 0.25 MG OR 0.5 MG(2 MG/1.5 ML) PEN INJECTOR    inject 0.5mg subcutaneously EVERY WEEK    OZEMPIC 1 MG/DOSE (4 MG/3 ML)    inject 1mg ONCE WEEKLY ON THE same DAY of each WEEK    PRAZOSIN (MINIPRESS) 5 MG CAPSULE    Take 1 capsule (5 mg total) by mouth every evening.    PROPRANOLOL (INDERAL LA) 60 MG 24 HR CAPSULE    Take 1 capsule (60 mg total) by mouth once daily.    ROSUVASTATIN (CRESTOR) 40 MG TAB    TAKE ONE TABLET BY MOUTH EVERY DAY FOR CHOLESTEROL /LDL LOWERING    SERTRALINE (ZOLOFT) 100 MG TABLET    take 2 tablets by mouth every evening    TRAZODONE (DESYREL) 150 MG TABLET    Take 1 tablet (150 mg total) by mouth every evening.    VITAMIN D (VITAMIN D3) 1000 UNITS TAB    Take 2,000 Units by mouth 2 (two) times a day.   Discontinued Medications    DEXLANSOPRAZOLE (DEXILANT) 60 MG CAPSULE    Take 60 mg by mouth Daily.    MOUNJARO 2.5 MG/0.5 ML PNIJ    INJECT 2.5ml subcutaneously every week FOR FOUR WEEKS THEN increase    NEXLIZET 180-10 MG TAB    TAKE ONE TABLET BY MOUTH EVERY DAY    PREDNISONE (DELTASONE) 20 MG TABLET    Take 20mg by mouth twice daily for 2 days then 20mg by mouth once daily for 3 days.    SOLIFENACIN (VESICARE) 10 MG TABLET    Take 1 tablet (10 mg total) by mouth once daily.        Past Social History:   Social History     Socioeconomic History    Marital status:    Occupational History    Occupation: RESERVATION     [employer]er:  [employer]   Tobacco Use    Smoking status: Never    Smokeless tobacco: Never   Substance and Sexual Activity    Alcohol use: Not Currently    Drug use: Never       Allergies:   Review of patient's allergies indicates:   Allergen Reactions    Tramadol Anaphylaxis    Acetaminophen-codeine     Hydrocod-cpm-pe-acetaminophen      Suicidal ideations    Lisinopril Swelling    Nsaids (non-steroidal anti-inflammatory drug)      Patient stated she was instructed to refrain from NSAID's after weight loss surgery in 2016    Toradol [ketorolac] Itching    Adhesive Blisters        Family History:   Family History   Problem Relation Age of Onset    Hypertension Mother     Diabetes Mother     Hyperlipidemia Mother     COPD Mother     Heart disease Father     Heart disease Brother     Diabetes Maternal Grandmother     Lupus Paternal Uncle     Lupus Paternal Aunt     Breast cancer Neg Hx     Colon cancer Neg Hx         Review of Systems:  Review of Systems   Constitutional:  Negative for activity change, appetite change, chills, diaphoresis, fatigue, fever and unexpected weight change.   HENT:  Negative for congestion, dental problem, drooling, ear discharge, ear pain, facial swelling, hearing loss, mouth sores, nosebleeds, postnasal drip, rhinorrhea, sinus pressure, sinus pain, sneezing, sore throat, tinnitus, trouble swallowing and voice change.    Eyes:  Negative for photophobia, pain, discharge, redness, itching and visual disturbance.   Respiratory:  Negative for apnea, cough, choking, chest tightness, shortness of breath, wheezing and stridor.    Cardiovascular:  Negative for chest pain and leg swelling.   Gastrointestinal:  Negative for abdominal distention, abdominal pain, anal bleeding, blood in stool, constipation, diarrhea, nausea, rectal pain and vomiting.   Endocrine: Negative for cold intolerance, heat intolerance, polydipsia, polyphagia and polyuria.   Genitourinary:  Positive for urgency. Negative for  decreased urine volume, difficulty urinating, dyspareunia, dysuria, enuresis, flank pain, frequency, genital sores, hematuria, menstrual problem, pelvic pain, vaginal bleeding, vaginal discharge and vaginal pain.        Bladder pressure with dribbling   Musculoskeletal:  Negative for arthralgias, back pain, gait problem, joint swelling, myalgias, neck pain and neck stiffness.   Skin:  Negative for color change, pallor, rash and wound.   Allergic/Immunologic: Negative for environmental allergies, food allergies and immunocompromised state.   Neurological:  Negative for dizziness, tremors, seizures, syncope, facial asymmetry, speech difficulty, weakness, light-headedness, numbness and headaches.   Hematological:  Negative for adenopathy. Does not bruise/bleed easily.   Psychiatric/Behavioral:  Negative for agitation, behavioral problems, confusion, decreased concentration, dysphoric mood, hallucinations, self-injury, sleep disturbance and suicidal ideas. The patient is not nervous/anxious and is not hyperactive.        Physical Exam:  Physical Exam  Cardiovascular:      Rate and Rhythm: Normal rate.   Pulmonary:      Effort: Pulmonary effort is normal.   Abdominal:      General: Abdomen is flat. Bowel sounds are normal.      Palpations: Abdomen is soft.   Genitourinary:     General: Normal vulva.   Neurological:      Mental Status: She is alert and oriented to person, place, and time.     PVR: > 10 mL (bladder scanner appears to be malfunctioning and is requiring calibration)    Assessment/Plan:     Urge incontinence: Patient has had failed treatment with oxybutynin extended release and VESIcare. We will start patient on trospium. We will have patient follow-up in 4 weeks at [facility] to assess effectiveness of medication and have bladder scan to assess for urinary retention.     Discussed performing Kegel exercises to strengthen the pelvic floor. Also discussed physical therapy for pelvic health however  patient wishes to try medication and Kegel's at home prior to going to physical therapy.     Follow up in 4 weeks for bladder scan and to evaluate medication effectiveness    Problem List Items Addressed This Visit    None  Visit Diagnoses       Urge incontinence    -  Primary    Relevant Medications    trospium (SANCTURA) 20 mg Tab tablet    Other Relevant Orders    POCT Urinalysis(Instrument) (Completed)    POCT Bladder Scan    Lower urinary tract symptoms (LUTS)

## 2024-04-10 NOTE — PROGRESS NOTES
Subjective:       Patient ID: Xena Holliday is a 58 y.o. female.    Chief Complaint: No chief complaint on file.      HPI: 58-year-old female established patient here for 3 month follow-up of medication for urge incontinence.  At one time patient was on Myrbetric prescribed from Dr. Ring that the patient said worked well for her, however [facility] that [facility].  Patient was then started on oxybutynin extended release that was ineffective and at last visit was started on VESIcare.  Patient reports today that VESIcare is also ineffective.  Patient continues to have urinary urgency and bladder pressure at the end of voiding with dribbling.  Patient reports she is currently wearing panty liners and the rate of changing varies from day today.  Typically she changes her panty liner 1-2 times per day however she stays close to the bathroom to avoid full episodes of incontinence.  Patient denies dysuria, odor, fever chills, urinary frequency, or other symptoms of infection.  Denies symptoms of stress incontinence.       Past Medical History:   Past Medical History:   Diagnosis Date    Anxiety     Arthritis     Bilateral hearing loss 06/14/2022    Wears hearing aids    Depression     Diverticulitis     Essential hypertension 08/04/2014    JOHN (generalized anxiety disorder) 09/30/2022    GERD (gastroesophageal reflux disease)     Mixed hyperlipidemia 10/22/2018    Used to take a statin but stopped after gastric sleeve PCP was Dr. Slaughter but now seeing us and we will refill meds in the future (10/2018) Colonoscopy 2018 fine [facility] 2018 normal.  Total Hyst so no PAPs anymore. Hx Gastric Sleeve 2016 (lost 120lb) had Pneumonia vaccine in the past 5 years.  not due again until age 67    PTSD (post-traumatic stress disorder)     Sinusitis     Sleep apnea     Tinnitus     Vertigo        Past Surgical Historical:   Past Surgical History:   Procedure Laterality Date    carpel tunnel Left  04/2022    carpel tunnel sx right  Right     7/23/21    CHOLECYSTECTOMY      COLONOSCOPY N/A     Gastric sleeve  12/2016    HYSTERECTOMY  2007    Total    IMPLANTATION OF COCHLEAR PROSTHESIS Left 3/9/2023    Procedure: LEFT COCHLEAR IMPLANT *** FACICAL NERVE MONITORIN WILL BE REQUIRED BY NDM***;  Surgeon: Jordan Colmenares Jr., MD;  Location: [facility];  Service: ENT;  Laterality: Left;    KNEE ARTHROSCOPY      TUBAL LIGATION          Medications:   Medication List with Changes/Refills   Current Medications    ALPRAZOLAM (XANAX) 0.25 MG TABLET    TAKE ONE TABLET BY MOUTH EVERY DAY AS NEEDED anxiety    AMLODIPINE (NORVASC) 5 MG TABLET    Take 1 tablet by mouth once daily.    ARIPIPRAZOLE (ABILIFY) 2 MG TAB    TAKE ONE TABLET BY MOUTH EVERY MORNING    CYANOCOBALAMIN (VITAMIN B-12) 1000 MCG TABLET    Take 1 tablet by mouth once daily.    DESONIDE (DESOWEN) 0.05 % CREAM    APPLY TO THE AFFECTED AREA(S) TWICE DAILY FOR SEVEN DAYS    DEXLANSOPRAZOLE (DEXILANT) 60 MG CAPSULE    Take 60 mg by mouth Daily.    DICLOFENAC SODIUM (VOLTAREN) 1 % GEL    Apply 2 g topically 4 (four) times daily. To affected area of ankle and elbow    ELETRIPTAN (RELPAX) 40 MG TABLET    Take 1 tablet by mouth once daily.    EMGALITY  MG/ML PNIJ    inject 120mg into THE SKIN EVERY 28 DAYS    FAMOTIDINE (PEPCID) 40 MG TABLET    take 1 tablet by mouth every evening as needed for breakthrough heartburn and acid relfux    FOLIC ACID (FOLVITE) 1 MG TABLET    Take 1 tablet by mouth once daily.    HYDRALAZINE (APRESOLINE) 10 MG TABLET    Take 10 mg by mouth 3 (three) times daily.    LOSARTAN-HYDROCHLOROTHIAZIDE 50-12.5 MG (HYZAAR) 50-12.5 MG PER TABLET    Take 1 tablet by mouth daily as needed.    MAGNESIUM OXIDE (MAG-OX) 400 MG (241.3 MG MAGNESIUM) TABLET    Take 1 tablet by mouth nightly.    METHYL SALICYLATE-MENTHOL 15-10% 15-10 % CREA    APPLY MODERATE AMOUNT TO THE SKIN THREE TIMES DAILY AS NEEDED FOR PAIN    METOCLOPRAMIDE HCL (REGLAN)  10 MG TABLET    5 mg.    MOUNJARO 2.5 MG/0.5 ML PNIJ    INJECT 2.5ml subcutaneously every week FOR FOUR WEEKS THEN increase    MULTIVITAMIN (THERAGRAN) PER TABLET    Take 1 tablet by mouth once daily.    NAPROXEN (NAPROSYN) 500 MG TABLET    Take 1 tablet (500 mg total) by mouth 2 (two) times daily as needed (pain). Take with food    NEXLIZET 180-10 MG TAB    TAKE ONE TABLET BY MOUTH EVERY DAY    NYSTATIN (MYCOSTATIN) POWDER    Apply topically 2 (two) times daily. To affected area of skin    OLMESARTAN-HYDROCHLOROTHIAZIDE (BENICAR HCT) 40-12.5 MG TAB    Take 1 tablet by mouth once daily.    OMEPRAZOLE (PRILOSEC) 40 MG CAPSULE    Take 1 capsule (40 mg total) by mouth every morning.    OZEMPIC 0.25 MG OR 0.5 MG(2 MG/1.5 ML) PEN INJECTOR    inject 0.5mg subcutaneously EVERY WEEK    OZEMPIC 1 MG/DOSE (4 MG/3 ML)    inject 1mg ONCE WEEKLY ON THE same DAY of each WEEK    PRAZOSIN (MINIPRESS) 5 MG CAPSULE    Take 1 capsule (5 mg total) by mouth every evening.    PREDNISONE (DELTASONE) 20 MG TABLET    Take 20mg by mouth twice daily for 2 days then 20mg by mouth once daily for 3 days.    PROPRANOLOL (INDERAL LA) 60 MG 24 HR CAPSULE    Take 1 capsule (60 mg total) by mouth once daily.    ROSUVASTATIN (CRESTOR) 40 MG TAB    TAKE ONE TABLET BY MOUTH EVERY DAY FOR CHOLESTEROL /LDL LOWERING    SERTRALINE (ZOLOFT) 100 MG TABLET    take 2 tablets by mouth every evening    SOLIFENACIN (VESICARE) 10 MG TABLET    Take 1 tablet (10 mg total) by mouth once daily.    TRAZODONE (DESYREL) 150 MG TABLET    Take 1 tablet (150 mg total) by mouth every evening.    VITAMIN D (VITAMIN D3) 1000 UNITS TAB    Take 2,000 Units by mouth 2 (two) times a day.        Past Social History:   Social History     Socioeconomic History    Marital status:    Occupational History    Occupation: [employer]     [employer]er: [employer]   Tobacco Use    Smoking status: Never    Smokeless tobacco: Never   Substance and Sexual Activity     Alcohol use: Not Currently    Drug use: Never       Allergies:   Review of patient's allergies indicates:   Allergen Reactions    Tramadol Anaphylaxis    Acetaminophen-codeine     Hydrocod-cpm-pe-acetaminophen      Suicidal ideations    Lisinopril Swelling    Nsaids (non-steroidal anti-inflammatory drug)      Patient stated she was instructed to refrain from NSAID's after weight loss surgery in 2016    Toradol [ketorolac] Itching    Adhesive Blisters        Family History:   Family History   Problem Relation Age of Onset    Hypertension Mother     Diabetes Mother     Hyperlipidemia Mother     COPD Mother     Heart disease Father     Heart disease Brother     Diabetes Maternal Grandmother     Lupus Paternal Uncle     Lupus Paternal Aunt     Breast cancer Neg Hx     Colon cancer Neg Hx         Review of Systems:  Review of Systems   Constitutional:  Negative for activity change, appetite change, chills, diaphoresis, fatigue, fever and unexpected weight change.   HENT:  Negative for congestion, dental problem, drooling, ear discharge, ear pain, facial swelling, hearing loss, mouth sores, nosebleeds, postnasal drip, rhinorrhea, sinus pressure, sinus pain, sneezing, sore throat, tinnitus, trouble swallowing and voice change.    Eyes:  Negative for photophobia, pain, discharge, redness, itching and visual disturbance.   Respiratory:  Negative for apnea, cough, choking, chest tightness, shortness of breath, wheezing and stridor.    Cardiovascular:  Negative for chest pain and leg swelling.   Gastrointestinal:  Negative for abdominal distention, abdominal pain, anal bleeding, blood in stool, constipation, diarrhea, nausea, rectal pain and vomiting.   Endocrine: Negative for cold intolerance, heat intolerance, polydipsia, polyphagia and polyuria.   Genitourinary:  Positive for urgency. Negative for decreased urine volume, difficulty urinating, dyspareunia, dysuria, enuresis, flank pain, frequency, genital  sores, hematuria, menstrual problem, pelvic pain, vaginal bleeding, vaginal discharge and vaginal pain.        Incontinence, bladder pressure   Musculoskeletal:  Negative for arthralgias, back pain, gait problem, joint swelling, myalgias, neck pain and neck stiffness.   Skin:  Negative for color change, pallor, rash and wound.   Allergic/Immunologic: Negative for environmental allergies, food allergies and immunocompromised state.   Neurological:  Negative for dizziness, tremors, seizures, syncope, facial asymmetry, speech difficulty, weakness, light-headedness, numbness and headaches.   Hematological:  Negative for adenopathy. Does not bruise/bleed easily.   Psychiatric/Behavioral:  Negative for agitation, behavioral problems, confusion, decreased concentration, dysphoric mood, hallucinations, self-injury, sleep disturbance and suicidal ideas. The patient is not nervous/anxious and is not hyperactive.      Physical Exam:  Physical Exam  Cardiovascular:      Rate and Rhythm: Normal rate.   Pulmonary:      Effort: Pulmonary effort is normal.   Abdominal:      General: Abdomen is flat. Bowel sounds are normal.      Palpations: Abdomen is soft.   Genitourinary:     General: Normal vulva.   Neurological:      Mental Status: She is alert and oriented to person, place, and time.   Urinalysis: Negative    PVR:  > 10 mL (bladder scanner appears to be malfunctioning and is requiring calibration)  Assessment/Plan:     Urge incontinence:  Patient has had failed treatment with oxybutynin extended release and VESIcare.  We will start patient on trospium.  We will have patient follow-up in 4 weeks at [facility] to assess effectiveness of medication and have bladder scan to assess for urinary retention.    Discussed performing Kegel exercises to strengthen the pelvic floor.  Also discussed physical therapy for pelvic health however patient wishes to try medication and Kegel's at home prior to going to physical  therapy.    Follow up in 4 weeks for bladder scan and to evaluate medication effectiveness    Problem List Items Addressed This Visit    None  Visit Diagnoses       Urge incontinence    -  Primary

## 2024-04-10 NOTE — PROGRESS NOTES
Subjective:       Patient ID: Xena Holliday is a 58 y.o. female.    Chief Complaint: Follow-up Clinical Reassessment      HPI: 58-year-old female established patient here for 3 month follow-up of medication for urge incontinence. At one time patient was on Myrbetric prescribed from Dr. Ring that the patient said worked well for her, however [facility] stopped [facility] that medication. Patient was then started on oxybutynin extended release that was ineffective and at last visit was started on VESIcare. Patient reports today that VESIcare is also ineffective. Patient continues to have urinary urgency and bladder pressure at the end of voiding with dribbling. Patient reports she is currently wearing panty liners and the rate of changing varies from day today. Typically she changes her panty liner 1-2 times per day however she stays close to the bathroom to avoid full episodes of incontinence. Patient denies dysuria, odor, fever chills, urinary frequency, or other symptoms of infection. Denies symptoms of stress incontinence.         Past Medical History:   Past Medical History:   Diagnosis Date    Anxiety     Arthritis     Bilateral hearing loss 06/14/2022    Wears hearing aids    Depression     Diverticulitis     Essential hypertension 08/04/2014    JOHN (generalized anxiety disorder) 09/30/2022    GERD (gastroesophageal reflux disease)     Mixed hyperlipidemia 10/22/2018    Used to take a statin but stopped after gastric sleeve PCP was Dr. Slaughter but now seeing us and we will refill meds in the future (10/2018) Colonoscopy 2018 fine [facility] 2018 normal.  Total Hyst so no PAPs anymore. Hx Gastric Sleeve 2016 (lost 120lb) had Pneumonia vaccine in the past 5 years.  not due again until age 67    PTSD (post-traumatic stress disorder)     Sinusitis     Sleep apnea     Tinnitus     Vertigo        Past Surgical Historical:   Past Surgical History:   Procedure Laterality Date    carpel tunnel Left 04/2022    carpel  tunnel sx right  Right     7/23/21    CHOLECYSTECTOMY      COLONOSCOPY N/A     Gastric sleeve  12/2016    HYSTERECTOMY  2007    Total    IMPLANTATION OF COCHLEAR PROSTHESIS Left 03/09/2023    Procedure: LEFT COCHLEAR IMPLANT  FACICAL NERVE MONITORIN WILL BE REQUIRED BY NDM;  Surgeon: Jordan Colmenares Jr., MD;  Location: [facility];  Service: ENT;  Laterality: Left;    KNEE ARTHROSCOPY      TUBAL LIGATION          Medications:   Medication List with Changes/Refills   New Medications    TROSPIUM (SANCTURA) 20 MG TAB TABLET    Take 1 tablet (20 mg total) by mouth 2 (two) times daily. for 60 doses   Current Medications    ALPRAZOLAM (XANAX) 0.25 MG TABLET    TAKE ONE TABLET BY MOUTH EVERY DAY AS NEEDED anxiety    AMLODIPINE (NORVASC) 5 MG TABLET    Take 1 tablet by mouth once daily.    ARIPIPRAZOLE (ABILIFY) 2 MG TAB    TAKE ONE TABLET BY MOUTH EVERY MORNING    CYANOCOBALAMIN (VITAMIN B-12) 1000 MCG TABLET    Take 1 tablet by mouth once daily.    DESONIDE (DESOWEN) 0.05 % CREAM    APPLY TO THE AFFECTED AREA(S) TWICE DAILY FOR SEVEN DAYS    DICLOFENAC SODIUM (VOLTAREN) 1 % GEL    Apply 2 g topically 4 (four) times daily. To affected area of ankle and elbow    ELETRIPTAN (RELPAX) 40 MG TABLET    Take 1 tablet by mouth once daily.    EMGALITY  MG/ML PNIJ    inject 120mg into THE SKIN EVERY 28 DAYS    FAMOTIDINE (PEPCID) 40 MG TABLET    take 1 tablet by mouth every evening as needed for breakthrough heartburn and acid relfux    FOLIC ACID (FOLVITE) 1 MG TABLET    Take 1 tablet by mouth once daily.    HYDRALAZINE (APRESOLINE) 10 MG TABLET    Take 10 mg by mouth 3 (three) times daily.    LOSARTAN-HYDROCHLOROTHIAZIDE 50-12.5 MG (HYZAAR) 50-12.5 MG PER TABLET    Take 1 tablet by mouth daily as needed.    MAGNESIUM OXIDE (MAG-OX) 400 MG (241.3 MG MAGNESIUM) TABLET    Take 1 tablet by mouth nightly.    METHYL SALICYLATE-MENTHOL 15-10% 15-10 % CREA    APPLY MODERATE AMOUNT TO THE SKIN THREE TIMES DAILY AS NEEDED FOR PAIN     METOCLOPRAMIDE HCL (REGLAN) 10 MG TABLET    5 mg.    MULTIVITAMIN (THERAGRAN) PER TABLET    Take 1 tablet by mouth once daily.    NAPROXEN (NAPROSYN) 500 MG TABLET    Take 1 tablet (500 mg total) by mouth 2 (two) times daily as needed (pain). Take with food    NYSTATIN (MYCOSTATIN) POWDER    Apply topically 2 (two) times daily. To affected area of skin    OLMESARTAN-HYDROCHLOROTHIAZIDE (BENICAR HCT) 40-12.5 MG TAB    Take 1 tablet by mouth once daily.    OMEPRAZOLE (PRILOSEC) 40 MG CAPSULE    Take 1 capsule (40 mg total) by mouth every morning.    OZEMPIC 0.25 MG OR 0.5 MG(2 MG/1.5 ML) PEN INJECTOR    inject 0.5mg subcutaneously EVERY WEEK    OZEMPIC 1 MG/DOSE (4 MG/3 ML)    inject 1mg ONCE WEEKLY ON THE same DAY of each WEEK    PRAZOSIN (MINIPRESS) 5 MG CAPSULE    Take 1 capsule (5 mg total) by mouth every evening.    PROPRANOLOL (INDERAL LA) 60 MG 24 HR CAPSULE    Take 1 capsule (60 mg total) by mouth once daily.    ROSUVASTATIN (CRESTOR) 40 MG TAB    TAKE ONE TABLET BY MOUTH EVERY DAY FOR CHOLESTEROL /LDL LOWERING    SERTRALINE (ZOLOFT) 100 MG TABLET    take 2 tablets by mouth every evening    TRAZODONE (DESYREL) 150 MG TABLET    Take 1 tablet (150 mg total) by mouth every evening.    VITAMIN D (VITAMIN D3) 1000 UNITS TAB    Take 2,000 Units by mouth 2 (two) times a day.   Discontinued Medications    DEXLANSOPRAZOLE (DEXILANT) 60 MG CAPSULE    Take 60 mg by mouth Daily.    MOUNJARO 2.5 MG/0.5 ML PNIJ    INJECT 2.5ml subcutaneously every week FOR FOUR WEEKS THEN increase    NEXLIZET 180-10 MG TAB    TAKE ONE TABLET BY MOUTH EVERY DAY    PREDNISONE (DELTASONE) 20 MG TABLET    Take 20mg by mouth twice daily for 2 days then 20mg by mouth once daily for 3 days.    SOLIFENACIN (VESICARE) 10 MG TABLET    Take 1 tablet (10 mg total) by mouth once daily.        Past Social History:   Social History     Socioeconomic History    Marital status:    Occupational History    Occupation: RESERVATION     [employer]er:  [employer]   Tobacco Use    Smoking status: Never    Smokeless tobacco: Never   Substance and Sexual Activity    Alcohol use: Not Currently    Drug use: Never       Allergies:   Review of patient's allergies indicates:   Allergen Reactions    Tramadol Anaphylaxis    Acetaminophen-codeine     Hydrocod-cpm-pe-acetaminophen      Suicidal ideations    Lisinopril Swelling    Nsaids (non-steroidal anti-inflammatory drug)      Patient stated she was instructed to refrain from NSAID's after weight loss surgery in 2016    Toradol [ketorolac] Itching    Adhesive Blisters        Family History:   Family History   Problem Relation Age of Onset    Hypertension Mother     Diabetes Mother     Hyperlipidemia Mother     COPD Mother     Heart disease Father     Heart disease Brother     Diabetes Maternal Grandmother     Lupus Paternal Uncle     Lupus Paternal Aunt     Breast cancer Neg Hx     Colon cancer Neg Hx         Review of Systems:  Review of Systems   Constitutional:  Negative for activity change, appetite change, chills, diaphoresis, fatigue, fever and unexpected weight change.   HENT:  Negative for congestion, dental problem, drooling, ear discharge, ear pain, facial swelling, hearing loss, mouth sores, nosebleeds, postnasal drip, rhinorrhea, sinus pressure, sinus pain, sneezing, sore throat, tinnitus, trouble swallowing and voice change.    Eyes:  Negative for photophobia, pain, discharge, redness, itching and visual disturbance.   Respiratory:  Negative for apnea, cough, choking, chest tightness, shortness of breath, wheezing and stridor.    Cardiovascular:  Negative for chest pain and leg swelling.   Gastrointestinal:  Negative for abdominal distention, abdominal pain, anal bleeding, blood in stool, constipation, diarrhea, nausea, rectal pain and vomiting.   Endocrine: Negative for cold intolerance, heat intolerance, polydipsia, polyphagia and polyuria.   Genitourinary:  Positive for urgency. Negative for  decreased urine volume, difficulty urinating, dyspareunia, dysuria, enuresis, flank pain, frequency, genital sores, hematuria, menstrual problem, pelvic pain, vaginal bleeding, vaginal discharge and vaginal pain.        Bladder pressure with dribbling   Musculoskeletal:  Negative for arthralgias, back pain, gait problem, joint swelling, myalgias, neck pain and neck stiffness.   Skin:  Negative for color change, pallor, rash and wound.   Allergic/Immunologic: Negative for environmental allergies, food allergies and immunocompromised state.   Neurological:  Negative for dizziness, tremors, seizures, syncope, facial asymmetry, speech difficulty, weakness, light-headedness, numbness and headaches.   Hematological:  Negative for adenopathy. Does not bruise/bleed easily.   Psychiatric/Behavioral:  Negative for agitation, behavioral problems, confusion, decreased concentration, dysphoric mood, hallucinations, self-injury, sleep disturbance and suicidal ideas. The patient is not nervous/anxious and is not hyperactive.        Physical Exam:  Physical Exam  Cardiovascular:      Rate and Rhythm: Normal rate.   Pulmonary:      Effort: Pulmonary effort is normal.   Abdominal:      General: Abdomen is flat. Bowel sounds are normal.      Palpations: Abdomen is soft.   Genitourinary:     General: Normal vulva.   Neurological:      Mental Status: She is alert and oriented to person, place, and time.     PVR: > 10 mL (bladder scanner appears to be malfunctioning and is requiring calibration)    Assessment/Plan:     Urge incontinence: Patient has had failed treatment with oxybutynin extended release and VESIcare. We will start patient on trospium. We will have patient follow-up in 4 weeks at [facility] to assess effectiveness of medication and have bladder scan to assess for urinary retention.     Discussed performing Kegel exercises to strengthen the pelvic floor. Also discussed physical therapy for pelvic health however  patient wishes to try medication and Kegel's at home prior to going to physical therapy.     Follow up in 4 weeks for bladder scan and to evaluate medication effectiveness    Problem List Items Addressed This Visit    None  Visit Diagnoses       Urge incontinence    -  Primary    Relevant Medications    trospium (SANCTURA) 20 mg Tab tablet    Other Relevant Orders    POCT Urinalysis(Instrument) (Completed)    POCT Bladder Scan    Lower urinary tract symptoms (LUTS)

## 2024-05-08 ENCOUNTER — OFFICE VISIT (OUTPATIENT)
Dept: UROLOGY | Facility: CLINIC | Age: 59
End: 2024-05-08
Payer: OTHER GOVERNMENT

## 2024-05-08 VITALS — HEIGHT: 62 IN | BODY MASS INDEX: 53.04 KG/M2 | HEART RATE: 76 BPM | OXYGEN SATURATION: 99 %

## 2024-05-08 DIAGNOSIS — N39.41 URGE INCONTINENCE: Primary | ICD-10-CM

## 2024-05-08 LAB
BILIRUBIN, UA POC OHS: ABNORMAL
BLOOD, UA POC OHS: NEGATIVE
CLARITY, UA POC OHS: CLEAR
COLOR, UA POC OHS: YELLOW
GLUCOSE, UA POC OHS: NEGATIVE
KETONES, UA POC OHS: ABNORMAL
LEUKOCYTES, UA POC OHS: NEGATIVE
NITRITE, UA POC OHS: NEGATIVE
PH, UA POC OHS: 5.5
PROTEIN, UA POC OHS: 30
SPECIFIC GRAVITY, UA POC OHS: 1.02
UROBILINOGEN, UA POC OHS: 0.2

## 2024-05-08 PROCEDURE — 81003 URINALYSIS AUTO W/O SCOPE: CPT | Mod: QW,S$GLB,,

## 2024-05-08 PROCEDURE — 99214 OFFICE O/P EST MOD 30 MIN: CPT | Mod: S$GLB,,,

## 2024-05-08 RX ORDER — MIRABEGRON 25 MG/1
25 TABLET, FILM COATED, EXTENDED RELEASE ORAL DAILY
Qty: 30 TABLET | Refills: 11 | Status: SHIPPED | OUTPATIENT
Start: 2024-05-08 | End: 2025-05-08

## 2024-05-08 NOTE — PROGRESS NOTES
Subjective:       Patient ID: Xena Holliday is a 58 y.o. female.    Chief Complaint: Urinary Incontinence      HPI: 58-year-old female established patient here for 4 week follow-up of medication for urge incontinence. At one time patient was on Myrbetric prescribed from Dr. Ring that the patient said worked well for her, however [facility]. Patient was then started on oxybutynin extended release that was ineffective she then tried VESIcare, and this medication was ineffective as well.  At last visit we started patient on trospium and patient reports today that this medication was ineffective for her urge incontinence. Patient continues to have urinary urgency and bladder pressure at the end of voiding with dribbling. Patient reports she is currently wearing panty liners and the rate of changing varies from day today. Typically she changes her panty liner 1-2 times per day however she stays close to the bathroom to avoid full episodes of incontinence. Patient denies dysuria, odor, fever chills, urinary frequency, or other symptoms of infection. Denies symptoms of stress incontinence.            Past Medical History:   Past Medical History:   Diagnosis Date    Anxiety     Arthritis     Bilateral hearing loss 06/14/2022    Wears hearing aids    Depression     Diverticulitis     Essential hypertension 08/04/2014    JOHN (generalized anxiety disorder) 09/30/2022    GERD (gastroesophageal reflux disease)     Mixed hyperlipidemia 10/22/2018    Used to take a statin but stopped after gastric sleeve PCP was Dr. Slaughter but now seeing us and we will refill meds in the future (10/2018) Colonoscopy 2018 fine [facility] 2018 normal.  Total Hyst so no PAPs anymore. Hx Gastric Sleeve 2016 (lost 120lb) had Pneumonia vaccine in the past 5 years.  not due again until age 67    PTSD (post-traumatic stress disorder)     Sinusitis     Sleep apnea     Tinnitus     Vertigo        Past Surgical  Historical:   Past Surgical History:   Procedure Laterality Date    carpel tunnel Left 04/2022    carpel tunnel sx right  Right     7/23/21    CHOLECYSTECTOMY      COLONOSCOPY N/A     Gastric sleeve  12/2016    HYSTERECTOMY  2007    Total    IMPLANTATION OF COCHLEAR PROSTHESIS Left 03/09/2023    Procedure: LEFT COCHLEAR IMPLANT  FACICAL NERVE MONITORIN WILL BE REQUIRED BY NDM;  Surgeon: Jordan Colmenares Jr., MD;  Location: [facility];  Service: ENT;  Laterality: Left;    KNEE ARTHROSCOPY      TUBAL LIGATION          Medications:   Medication List with Changes/Refills   Current Medications    ALPRAZOLAM (XANAX) 0.25 MG TABLET    TAKE ONE TABLET BY MOUTH EVERY DAY AS NEEDED anxiety    AMLODIPINE (NORVASC) 5 MG TABLET    Take 1 tablet by mouth once daily.    ARIPIPRAZOLE (ABILIFY) 2 MG TAB    TAKE ONE TABLET BY MOUTH EVERY MORNING    CYANOCOBALAMIN (VITAMIN B-12) 1000 MCG TABLET    Take 1 tablet by mouth once daily.    DESONIDE (DESOWEN) 0.05 % CREAM    APPLY TO THE AFFECTED AREA(S) TWICE DAILY FOR SEVEN DAYS    DICLOFENAC SODIUM (VOLTAREN) 1 % GEL    Apply 2 g topically 4 (four) times daily. To affected area of ankle and elbow    ELETRIPTAN (RELPAX) 40 MG TABLET    Take 1 tablet by mouth once daily.    EMGALITY  MG/ML PNIJ    inject 120mg into THE SKIN EVERY 28 DAYS    FAMOTIDINE (PEPCID) 40 MG TABLET    take 1 tablet by mouth every evening as needed for breakthrough heartburn and acid relfux    FOLIC ACID (FOLVITE) 1 MG TABLET    Take 1 tablet by mouth once daily.    HYDRALAZINE (APRESOLINE) 10 MG TABLET    Take 10 mg by mouth 3 (three) times daily.    LOSARTAN-HYDROCHLOROTHIAZIDE 50-12.5 MG (HYZAAR) 50-12.5 MG PER TABLET    Take 1 tablet by mouth daily as needed.    MAGNESIUM OXIDE (MAG-OX) 400 MG (241.3 MG MAGNESIUM) TABLET    Take 1 tablet by mouth nightly.    METHYL SALICYLATE-MENTHOL 15-10% 15-10 % CREA    APPLY MODERATE AMOUNT TO THE SKIN THREE TIMES DAILY AS NEEDED FOR PAIN    METOCLOPRAMIDE HCL (REGLAN)  10 MG TABLET    5 mg.    MULTIVITAMIN (THERAGRAN) PER TABLET    Take 1 tablet by mouth once daily.    NAPROXEN (NAPROSYN) 500 MG TABLET    Take 1 tablet (500 mg total) by mouth 2 (two) times daily as needed (pain). Take with food    NYSTATIN (MYCOSTATIN) POWDER    Apply topically 2 (two) times daily. To affected area of skin    OLMESARTAN-HYDROCHLOROTHIAZIDE (BENICAR HCT) 40-12.5 MG TAB    Take 1 tablet by mouth once daily.    OMEPRAZOLE (PRILOSEC) 40 MG CAPSULE    Take 1 capsule (40 mg total) by mouth every morning.    OZEMPIC 0.25 MG OR 0.5 MG(2 MG/1.5 ML) PEN INJECTOR    inject 0.5mg subcutaneously EVERY WEEK    OZEMPIC 1 MG/DOSE (4 MG/3 ML)    inject 1mg ONCE WEEKLY ON THE same DAY of each WEEK    PRAZOSIN (MINIPRESS) 5 MG CAPSULE    Take 1 capsule (5 mg total) by mouth every evening.    PROPRANOLOL (INDERAL LA) 60 MG 24 HR CAPSULE    Take 1 capsule (60 mg total) by mouth once daily.    ROSUVASTATIN (CRESTOR) 40 MG TAB    TAKE ONE TABLET BY MOUTH EVERY DAY FOR CHOLESTEROL /LDL LOWERING    SERTRALINE (ZOLOFT) 100 MG TABLET    take 2 tablets by mouth every evening    TRAZODONE (DESYREL) 150 MG TABLET    Take 1 tablet (150 mg total) by mouth every evening.    TROSPIUM (SANCTURA) 20 MG TAB TABLET    Take 1 tablet (20 mg total) by mouth 2 (two) times daily. for 60 doses    VITAMIN D (VITAMIN D3) 1000 UNITS TAB    Take 2,000 Units by mouth 2 (two) times a day.        Past Social History:   Social History     Socioeconomic History    Marital status:    Occupational History    Occupation: [employer]     Employer: [employer]   Tobacco Use    Smoking status: Never     Passive exposure: Never    Smokeless tobacco: Never   Substance and Sexual Activity    Alcohol use: Not Currently    Drug use: Never    Sexual activity: Not Currently       Allergies:   Review of patient's allergies indicates:   Allergen Reactions    Tramadol Anaphylaxis    Acetaminophen-codeine     Hydrocod-cpm-pe-acetaminophen       Suicidal ideations    Lisinopril Swelling    Nsaids (non-steroidal anti-inflammatory drug)      Patient stated she was instructed to refrain from NSAID's after weight loss surgery in 2016    Toradol [ketorolac] Itching    Adhesive Blisters        Family History:   Family History   Problem Relation Name Age of Onset    Hypertension Mother      Diabetes Mother      Hyperlipidemia Mother      COPD Mother      Heart disease Father      Heart disease Brother      Diabetes Maternal Grandmother      Lupus Paternal Uncle      Lupus Paternal Aunt      Breast cancer Neg Hx      Colon cancer Neg Hx          Review of Systems:  Review of Systems   Constitutional:  Negative for activity change, appetite change, chills, diaphoresis, fatigue, fever and unexpected weight change.   HENT:  Negative for congestion, dental problem, drooling, ear discharge, ear pain, facial swelling, hearing loss, mouth sores, nosebleeds, postnasal drip, rhinorrhea, sinus pressure, sinus pain, sneezing, sore throat, tinnitus, trouble swallowing and voice change.    Eyes:  Negative for photophobia, pain, discharge, redness, itching and visual disturbance.   Respiratory:  Negative for apnea, cough, choking, chest tightness, shortness of breath, wheezing and stridor.    Cardiovascular:  Negative for chest pain and leg swelling.   Gastrointestinal:  Negative for abdominal distention, abdominal pain, anal bleeding, blood in stool, constipation, diarrhea, nausea, rectal pain and vomiting.   Endocrine: Negative for cold intolerance, heat intolerance, polydipsia, polyphagia and polyuria.   Genitourinary:  Positive for urgency. Negative for decreased urine volume, difficulty urinating, dyspareunia, dysuria, enuresis, flank pain, frequency, genital sores, hematuria, menstrual problem, pelvic pain, vaginal bleeding, vaginal discharge and vaginal pain.        Bladder pressure, dysuria at end of void   Musculoskeletal:  Negative for arthralgias, back pain, gait  problem, joint swelling, myalgias, neck pain and neck stiffness.   Skin:  Negative for color change, pallor, rash and wound.   Allergic/Immunologic: Negative for environmental allergies, food allergies and immunocompromised state.   Neurological:  Negative for dizziness, tremors, seizures, syncope, facial asymmetry, speech difficulty, weakness, light-headedness, numbness and headaches.   Hematological:  Negative for adenopathy. Does not bruise/bleed easily.   Psychiatric/Behavioral:  Negative for agitation, behavioral problems, confusion, decreased concentration, dysphoric mood, hallucinations, self-injury, sleep disturbance and suicidal ideas. The patient is not nervous/anxious and is not hyperactive.      Physical Exam:  Physical Exam  Cardiovascular:      Rate and Rhythm: Normal rate.   Pulmonary:      Effort: Pulmonary effort is normal.   Abdominal:      General: Abdomen is flat. Bowel sounds are normal.      Palpations: Abdomen is soft.   Genitourinary:     General: Normal vulva.   Neurological:      Mental Status: She is alert and oriented to person, place, and time.   Urinalysis: Negative    PVR 46 mL  Assessment/Plan:     Urinary urge incontinence:  Patient has failed multiple oral treatments including oxybutynin, VESIcare, and trospium.  Patient has previously tried Myrbetric in the past with great results and would like to try this medication again if approved by the [facility]. we will send patient with samples of Myrbetric 25 mg and [facility].  Med[facility] be covered under [facility] due to multiple other failed oral treatments.    Follow up in 6 months or sooner if needed  Problem List Items Addressed This Visit    None

## 2024-05-23 ENCOUNTER — E-VISIT (OUTPATIENT)
Dept: FAMILY MEDICINE | Facility: CLINIC | Age: 59
End: 2024-05-23
Payer: OTHER GOVERNMENT

## 2024-05-23 DIAGNOSIS — N39.0 URINARY TRACT INFECTION WITHOUT HEMATURIA, SITE UNSPECIFIED: Primary | ICD-10-CM

## 2024-05-23 PROCEDURE — 99422 OL DIG E/M SVC 11-20 MIN: CPT | Mod: ,,, | Performed by: STUDENT IN AN ORGANIZED HEALTH CARE EDUCATION/TRAINING PROGRAM

## 2024-05-23 RX ORDER — NITROFURANTOIN 25; 75 MG/1; MG/1
100 CAPSULE ORAL 2 TIMES DAILY
Qty: 10 CAPSULE | Refills: 0 | Status: SHIPPED | OUTPATIENT
Start: 2024-05-23 | End: 2024-05-28

## 2024-05-23 RX ORDER — PHENAZOPYRIDINE HYDROCHLORIDE 200 MG/1
200 TABLET, FILM COATED ORAL 3 TIMES DAILY PRN
Qty: 9 TABLET | Refills: 0 | Status: SHIPPED | OUTPATIENT
Start: 2024-05-23 | End: 2024-05-26

## 2024-05-23 NOTE — PROGRESS NOTES
Patient ID: Xena Holliday is a 58 y.o. female.    Chief Complaint: Urinary Tract Infection (Entered automatically based on patient selection in Patient Portal.)          274}  The patient initiated a request through [employer] on 5/23/2024 for evaluation and management with a chief complaint of Urinary Tract Infection (Entered automatically based on patient selection in Patient Portal.)     I evaluated the questionnaire submission on 05/23/2024 .    Total Time (in minutes): 11     [facility] Peq Evisit Uti Questionnaire    5/23/2024  1:10 PM CDT - Filed by Patient   Do you agree to participate in an E-Visit? Yes   If you have any of the following symptoms, please present to your local emergency room or call 911:  I acknowledge   What is the main issue you would like addressed today? I cant hold my urine. Its painful when i go, and i cant stop going   What symptoms do you currently have? Pain while passing urine;  Difficulty passing urine;  Change in urine appearance or smell   When did your symptoms first appear? 5/22/2024   List what you have done or taken to help your symptoms. Nothing   Please indicate whether you have had the following symptoms during the past 24 hours     Urgent urination (a sudden and uncontrollable urge to urinate) Severe   Frequent urination of small amounts of urine (going to the toilet very often) Severe   Burning pain when urinating Moderate   Incomplete bladder emptying (still feel like you need to urinate again after urination) Severe   Pain not associated with urination in the lower abdomen below the belly button) None   What does your urine look like? Cloudy   Blood seen in the urine None   Flank pain (pain in one or both sides of the lower back) Severe   Abnormal Vaginal Discharge (abnormal amount, color and/or odor) None   Discharge from the urethra (urinary opening) without urination None   High body temperature/fever? None-<99.5   Please rate how much discomfort you have  experience because of the symptoms in the past 24 hours: Moderate   Please indicate how the symptoms have interfered with your every day activities/work in the past 24 hours: Moderate   Please indicate how these symptoms have interfered with your social activities (visiting people, meeting with friends, etc.) in the past 24 hours? Severe   Are you a diabetic? No   Please indicate whether you have the following at the time of completion of this questionnaire: None of the above   Provide any additional information you feel is important.    Please attach any relevant images or files (if you have performed a home test for UTI, please submit a photo of results)    Are you able to take your vital signs? No          Active Problem List with Overview Notes    Diagnosis Date Noted    JOHN (generalized anxiety disorder) 09/30/2022    Bilateral hearing loss 06/14/2022     Wears hearing aids      Osteoarthritis 11/06/2020    Depression 01/21/2020    Anxiety 01/21/2020    Arthropathy 10/22/2018     Arthritis      Mixed hyperlipidemia 10/22/2018    Essential hypertension 08/04/2014      Recent Labs Obtained:  Lab Results   Component Value Date    WBC 3.8 (L) 03/07/2023    HGB 13.0 03/07/2023    HCT 41.8 03/07/2023    MCV 89.3 03/07/2023     03/07/2023     05/11/2023    K 4.0 05/11/2023    GLU 80 05/11/2023    CREATININE 0.80 05/11/2023    EGFRNORACEVR 87 05/11/2023    HGBA1C 5.4 05/11/2023      Review of patient's allergies indicates:   Allergen Reactions    Tramadol Anaphylaxis    Acetaminophen-codeine     Hydrocod-cpm-pe-acetaminophen      Suicidal ideations    Lisinopril Swelling    Nsaids (non-steroidal anti-inflammatory drug)      Patient stated she was instructed to refrain from NSAID's after weight loss surgery in 2016    Toradol [ketorolac] Itching    Adhesive Blisters       Encounter Diagnosis   Name Primary?    Urinary tract infection without hematuria, site unspecified Yes        No orders of the defined  types were placed in this encounter.     Medications Ordered This Encounter   Medications    nitrofurantoin, macrocrystal-monohydrate, (MACROBID) 100 MG capsule     Sig: Take 1 capsule (100 mg total) by mouth 2 (two) times daily. for 5 days     Dispense:  10 capsule     Refill:  0    phenazopyridine (PYRIDIUM) 200 MG tablet     Sig: Take 1 tablet (200 mg total) by mouth 3 (three) times daily as needed.     Dispense:  9 tablet     Refill:  0        E-Visit Time Tracking:    Day 1 Time (in minutes): 11    Total Time (in minutes): 11      274}

## 2024-07-01 ENCOUNTER — HOSPITAL ENCOUNTER (OUTPATIENT)
Dept: RADIOLOGY | Facility: HOSPITAL | Age: 59
Discharge: HOME | End: 2024-07-01
Attending: STUDENT IN AN ORGANIZED HEALTH CARE EDUCATION/TRAINING PROGRAM
Payer: OTHER GOVERNMENT

## 2024-07-01 DIAGNOSIS — Z12.31 SCREENING MAMMOGRAM, ENCOUNTER FOR: ICD-10-CM

## 2024-07-01 PROCEDURE — 77067 SCR MAMMO BI INCL CAD: CPT | Mod: TC

## 2024-07-31 ENCOUNTER — HOSPITAL ENCOUNTER (OUTPATIENT)
Dept: RADIOLOGY | Facility: HOSPITAL | Age: 59
Discharge: HOME | End: 2024-07-31
Attending: STUDENT IN AN ORGANIZED HEALTH CARE EDUCATION/TRAINING PROGRAM
Payer: OTHER GOVERNMENT

## 2024-07-31 DIAGNOSIS — M54.30 SCIATICA: ICD-10-CM

## 2024-07-31 DIAGNOSIS — M25.559 HIP PAIN: ICD-10-CM

## 2024-07-31 PROCEDURE — 74176 CT ABD & PELVIS W/O CONTRAST: CPT | Mod: TC

## 2024-07-31 PROCEDURE — 72131 CT LUMBAR SPINE W/O DYE: CPT | Mod: TC

## 2024-10-31 DIAGNOSIS — R32 INCONTINENCE: Primary | ICD-10-CM

## 2024-11-06 ENCOUNTER — OFFICE VISIT (OUTPATIENT)
Dept: UROLOGY | Facility: CLINIC | Age: 59
End: 2024-11-06
Payer: OTHER GOVERNMENT

## 2024-11-06 VITALS — BODY MASS INDEX: 52.08 KG/M2 | HEIGHT: 62 IN | WEIGHT: 283 LBS

## 2024-11-06 DIAGNOSIS — N32.81 OVERACTIVE BLADDER: ICD-10-CM

## 2024-11-06 DIAGNOSIS — N39.41 URGE URINARY INCONTINENCE: ICD-10-CM

## 2024-11-06 PROCEDURE — 99213 OFFICE O/P EST LOW 20 MIN: CPT | Mod: S$GLB,,,

## 2024-11-06 RX ORDER — MIRABEGRON 25 MG/1
25 TABLET, FILM COATED, EXTENDED RELEASE ORAL DAILY
Qty: 30 TABLET | Refills: 11 | Status: SHIPPED | OUTPATIENT
Start: 2024-11-06 | End: 2025-11-06

## 2024-11-06 NOTE — PROGRESS NOTES
Subjective:       Patient ID: Xena Holliday is a 58 y.o. female.    Chief Complaint: No chief complaint on file.      HPI: 58-year-old female established patient presents today for six-month follow up of overactive bladder.  Patient previously was experiencing urinary urgency and bladder pressure at the end of her urinary stream.  She was also complaining of dribbling.  She was required to wear panty liners which she was having to change 1-2 times per day and remain close to the bathroom to avoid full episodes of incontinence.  She had tried and failed oxybutynin, VESIcare and trospium.  She was started on Myrbetriq 25 mg and reports today that she has seen 100% improvement in her symptoms.  She denies dysuria, frequency, urgency, incontinence, hematuria, odor, fever or chills.           Past Medical History:   Past Medical History:   Diagnosis Date    Anxiety     Arthritis     Bilateral hearing loss 06/14/2022    Wears hearing aids    Depression     Diverticulitis     Essential hypertension 08/04/2014    JOHN (generalized anxiety disorder) 09/30/2022    GERD (gastroesophageal reflux disease)     Mixed hyperlipidemia 10/22/2018    Used to take a statin but stopped after gastric sleeve PCP was Dr. Slaughter but now seeing us and we will refill meds in the future (10/2018) Colonoscopy 2018 fine [facility] 2018 normal.  Total Hyst so no PAPs anymore. Hx Gastric Sleeve 2016 (lost 120lb) had Pneumonia vaccine in the past 5 years.  not due again until age 67    PTSD (post-traumatic stress disorder)     Sinusitis     Sleep apnea     Tinnitus     Vertigo        Past Surgical Historical:   Past Surgical History:   Procedure Laterality Date    carpel tunnel Left 04/2022    carpel tunnel sx right  Right     7/23/21    CHOLECYSTECTOMY      COLONOSCOPY N/A     Gastric sleeve  12/2016    HYSTERECTOMY  2007    Total    IMPLANTATION OF COCHLEAR PROSTHESIS Left 03/09/2023    Procedure: LEFT COCHLEAR IMPLANT  FACICAL NERVE MONITORIN  WILL BE REQUIRED BY NDM;  Surgeon: Jordan Colmenares Jr., MD;  Location: [facility];  Service: ENT;  Laterality: Left;    KNEE ARTHROSCOPY      TUBAL LIGATION          Medications:   Medication List with Changes/Refills   Current Medications    ALPRAZOLAM (XANAX) 0.25 MG TABLET    TAKE ONE TABLET BY MOUTH EVERY DAY AS NEEDED anxiety    AMLODIPINE (NORVASC) 5 MG TABLET    Take 1 tablet by mouth once daily.    ARIPIPRAZOLE (ABILIFY) 2 MG TAB    TAKE ONE TABLET BY MOUTH EVERY MORNING    CYANOCOBALAMIN (VITAMIN B-12) 1000 MCG TABLET    Take 1 tablet by mouth once daily.    DESONIDE (DESOWEN) 0.05 % CREAM    APPLY TO THE AFFECTED AREA(S) TWICE DAILY FOR SEVEN DAYS    DICLOFENAC SODIUM (VOLTAREN) 1 % GEL    Apply 2 g topically 4 (four) times daily. To affected area of ankle and elbow    ELETRIPTAN (RELPAX) 40 MG TABLET    Take 1 tablet by mouth once daily.    EMGALITY  MG/ML PNIJ    inject 120mg into THE SKIN EVERY 28 DAYS    FAMOTIDINE (PEPCID) 40 MG TABLET    take 1 tablet by mouth every evening as needed for breakthrough heartburn and acid relfux    FOLIC ACID (FOLVITE) 1 MG TABLET    Take 1 tablet by mouth once daily.    HYDRALAZINE (APRESOLINE) 10 MG TABLET    Take 10 mg by mouth 3 (three) times daily.    LOSARTAN-HYDROCHLOROTHIAZIDE 50-12.5 MG (HYZAAR) 50-12.5 MG PER TABLET    Take 1 tablet by mouth daily as needed.    MAGNESIUM OXIDE (MAG-OX) 400 MG (241.3 MG MAGNESIUM) TABLET    Take 1 tablet by mouth nightly.    METHYL SALICYLATE-MENTHOL 15-10% 15-10 % CREA    APPLY MODERATE AMOUNT TO THE SKIN THREE TIMES DAILY AS NEEDED FOR PAIN    METOCLOPRAMIDE HCL (REGLAN) 10 MG TABLET    5 mg.    MULTIVITAMIN (THERAGRAN) PER TABLET    Take 1 tablet by mouth once daily.    NAPROXEN (NAPROSYN) 500 MG TABLET    Take 1 tablet (500 mg total) by mouth 2 (two) times daily as needed (pain). Take with food    NYSTATIN (MYCOSTATIN) POWDER    Apply topically 2 (two) times daily. To affected area of skin     OLMESARTAN-HYDROCHLOROTHIAZIDE (BENICAR HCT) 40-12.5 MG TAB    Take 1 tablet by mouth once daily.    OMEPRAZOLE (PRILOSEC) 40 MG CAPSULE    Take 1 capsule (40 mg total) by mouth every morning.    OZEMPIC 0.25 MG OR 0.5 MG(2 MG/1.5 ML) PEN INJECTOR    inject 0.5mg subcutaneously EVERY WEEK    OZEMPIC 1 MG/DOSE (4 MG/3 ML)    inject 1mg ONCE WEEKLY ON THE same DAY of each WEEK    PRAZOSIN (MINIPRESS) 5 MG CAPSULE    Take 1 capsule (5 mg total) by mouth every evening.    PROPRANOLOL (INDERAL LA) 60 MG 24 HR CAPSULE    Take 1 capsule (60 mg total) by mouth once daily.    ROSUVASTATIN (CRESTOR) 40 MG TAB    TAKE ONE TABLET BY MOUTH EVERY DAY FOR CHOLESTEROL /LDL LOWERING    SERTRALINE (ZOLOFT) 100 MG TABLET    take 2 tablets by mouth every evening    TRAZODONE (DESYREL) 150 MG TABLET    Take 1 tablet (150 mg total) by mouth every evening.    VITAMIN D (VITAMIN D3) 1000 UNITS TAB    Take 2,000 Units by mouth 2 (two) times a day.   Changed and/or Refilled Medications    Modified Medication Previous Medication    MIRABEGRON (MYRBETRIQ) 25 MG TB24 ER TABLET mirabegron (MYRBETRIQ) 25 mg Tb24 ER tablet       Take 1 tablet (25 mg total) by mouth once daily.    Take 1 tablet (25 mg total) by mouth once daily.   Discontinued Medications    TROSPIUM (SANCTURA) 20 MG TAB TABLET    Take 1 tablet (20 mg total) by mouth 2 (two) times daily. for 60 doses        Past Social History:   Social History     Socioeconomic History    Marital status:    Occupational History    Occupation: [employer]     Employer: [employer]   Tobacco Use    Smoking status: Never     Passive exposure: Never    Smokeless tobacco: Never   Substance and Sexual Activity    Alcohol use: Not Currently    Drug use: Never    Sexual activity: Not Currently       Allergies:   Review of patient's allergies indicates:   Allergen Reactions    Tramadol Anaphylaxis    Acetaminophen-codeine     Hydrocod-cpm-pe-acetaminophen      Suicidal ideations     Lisinopril Swelling    Nsaids (non-steroidal anti-inflammatory drug)      Patient stated she was instructed to refrain from NSAID's after weight loss surgery in 2016    Toradol [ketorolac] Itching    Adhesive Blisters        Family History:   Family History   Problem Relation Name Age of Onset    Hypertension Mother      Diabetes Mother      Hyperlipidemia Mother      COPD Mother      Heart disease Father      Heart disease Brother      Diabetes Maternal Grandmother      Lupus Paternal Uncle      Lupus Paternal Aunt      Breast cancer Neg Hx      Colon cancer Neg Hx          Review of Systems:  Review of Systems   Constitutional:  Negative for activity change, appetite change, chills, diaphoresis, fatigue, fever and unexpected weight change.   HENT:  Negative for congestion, dental problem, drooling, ear discharge, ear pain, facial swelling, hearing loss, mouth sores, nosebleeds, postnasal drip, rhinorrhea, sinus pressure, sinus pain, sneezing, sore throat, tinnitus, trouble swallowing and voice change.    Eyes:  Negative for photophobia, pain, discharge, redness, itching and visual disturbance.   Respiratory:  Negative for apnea, cough, choking, chest tightness, shortness of breath, wheezing and stridor.    Cardiovascular:  Negative for chest pain and leg swelling.   Gastrointestinal:  Negative for abdominal distention, abdominal pain, anal bleeding, blood in stool, constipation, diarrhea, nausea, rectal pain and vomiting.   Endocrine: Negative for cold intolerance, heat intolerance, polydipsia, polyphagia and polyuria.   Genitourinary: Negative.  Negative for decreased urine volume, difficulty urinating, dyspareunia, dysuria, enuresis, flank pain, frequency, genital sores, hematuria, menstrual problem, pelvic pain, urgency, vaginal bleeding, vaginal discharge and vaginal pain.   Musculoskeletal:  Negative for arthralgias, back pain, gait problem, joint swelling, myalgias, neck pain and neck stiffness.   Skin:   Negative for color change, pallor, rash and wound.   Allergic/Immunologic: Negative for environmental allergies, food allergies and immunocompromised state.   Neurological:  Negative for dizziness, tremors, seizures, syncope, facial asymmetry, speech difficulty, weakness, light-headedness, numbness and headaches.   Hematological:  Negative for adenopathy. Does not bruise/bleed easily.   Psychiatric/Behavioral:  Negative for agitation, behavioral problems, confusion, decreased concentration, dysphoric mood, hallucinations, self-injury, sleep disturbance and suicidal ideas. The patient is not nervous/anxious and is not hyperactive.        Physical Exam:  Physical Exam  Cardiovascular:      Rate and Rhythm: Normal rate.   Pulmonary:      Effort: Pulmonary effort is normal.   Abdominal:      General: Abdomen is flat. Bowel sounds are normal.      Palpations: Abdomen is soft.   Neurological:      Mental Status: She is alert and oriented to person, place, and time.       Assessment/Plan:     Overactive bladder/urge urinary incontinence:  Patient is currently doing well on Myrbetriq 25 mg daily.  We will send her 1 year's worth of refills to her pharmacy.  Discussed effects of caffeine, tea, sodas, and alcohol on OAB symptoms. Instructed patient to decrease consumption of these fluids and to stop oral fluids approx 4 hours prior to bedtime to decrease night time symptoms. Education provided on bladder training, bladder control strategies, and PFT. Patient verbalized understanding.    Follow up in 1 year or sooner if needed   Problem List Items Addressed This Visit    None  Visit Diagnoses       Overactive bladder        Urge urinary incontinence        Relevant Medications    mirabegron (MYRBETRIQ) 25 mg Tb24 ER tablet